# Patient Record
Sex: MALE | Race: WHITE | Employment: FULL TIME | URBAN - METROPOLITAN AREA
[De-identification: names, ages, dates, MRNs, and addresses within clinical notes are randomized per-mention and may not be internally consistent; named-entity substitution may affect disease eponyms.]

---

## 2020-12-03 ENCOUNTER — OFFICE VISIT (OUTPATIENT)
Dept: PRIMARY CARE CLINIC | Age: 40
End: 2020-12-03
Payer: COMMERCIAL

## 2020-12-03 VITALS
SYSTOLIC BLOOD PRESSURE: 126 MMHG | HEIGHT: 72 IN | RESPIRATION RATE: 20 BRPM | BODY MASS INDEX: 33.18 KG/M2 | HEART RATE: 91 BPM | DIASTOLIC BLOOD PRESSURE: 80 MMHG | TEMPERATURE: 97.2 F | WEIGHT: 245 LBS | OXYGEN SATURATION: 100 %

## 2020-12-03 LAB
Lab: NORMAL
QC PASS/FAIL: NORMAL
SARS-COV-2, POC: DETECTED

## 2020-12-03 PROCEDURE — 87426 SARSCOV CORONAVIRUS AG IA: CPT | Performed by: FAMILY MEDICINE

## 2020-12-03 PROCEDURE — 99213 OFFICE O/P EST LOW 20 MIN: CPT | Performed by: FAMILY MEDICINE

## 2020-12-03 ASSESSMENT — ENCOUNTER SYMPTOMS
ABDOMINAL PAIN: 0
SINUS PAIN: 0
SHORTNESS OF BREATH: 0
NAUSEA: 0
SINUS PRESSURE: 0
COUGH: 1
SORE THROAT: 0

## 2020-12-03 NOTE — PROGRESS NOTES
Enrique Bernard  : 1980    Chief Complaint:     Chief Complaint   Patient presents with    Cough     chills    Fever    Generalized Body Aches    Diarrhea       HPI  Enrique Bernard 38 y.o. presents for   Chief Complaint   Patient presents with    Cough     chills    Fever    Generalized Body Aches    Diarrhea     Patient presents for body aches and fever and cough for the past 2 days. He states that his kids and wife all tested positive at home. He denies any shortness of breath currently. He has been tolerating fluids. All questions were answered to patients satisfaction. History reviewed. No pertinent past medical history. History reviewed. No pertinent surgical history. Social History     Socioeconomic History    Marital status:      Spouse name: None    Number of children: None    Years of education: None    Highest education level: None   Occupational History    None   Social Needs    Financial resource strain: None    Food insecurity     Worry: None     Inability: None    Transportation needs     Medical: None     Non-medical: None   Tobacco Use    Smoking status: Never Smoker    Smokeless tobacco: Never Used   Substance and Sexual Activity    Alcohol use: None    Drug use: None    Sexual activity: None   Lifestyle    Physical activity     Days per week: None     Minutes per session: None    Stress: None   Relationships    Social connections     Talks on phone: None     Gets together: None     Attends Taoism service: None     Active member of club or organization: None     Attends meetings of clubs or organizations: None     Relationship status: None    Intimate partner violence     Fear of current or ex partner: None     Emotionally abused: None     Physically abused: None     Forced sexual activity: None   Other Topics Concern    None   Social History Narrative    None       History reviewed. No pertinent family history.        No current outpatient medications on file. No current facility-administered medications for this visit. Allergies   Allergen Reactions    Keflex [Cephalexin]        Health Maintenance Due   Topic Date Due    HIV screen  07/20/1994    DTaP/Tdap/Td vaccine (1 - Tdap) 07/20/1998    Lipid screen  07/20/2019    Diabetes screen  07/20/2019    Flu vaccine (1) 09/01/2020           REVIEW OF SYSTEMS  Review of Systems   Constitutional: Positive for chills and fatigue. Negative for fever. HENT: Positive for postnasal drip. Negative for congestion, ear pain, sinus pressure, sinus pain and sore throat. Respiratory: Positive for cough. Negative for shortness of breath. Cardiovascular: Negative for chest pain. Gastrointestinal: Negative for abdominal pain and nausea. Genitourinary: Negative for frequency. Musculoskeletal: Positive for myalgias. Negative for arthralgias. Skin: Negative for rash. Neurological: Negative for dizziness. Hematological: Negative for adenopathy. Psychiatric/Behavioral: Negative for decreased concentration and sleep disturbance. PHYSICAL EXAM  /80   Pulse 91   Temp 97.2 °F (36.2 °C) (Oral)   Resp 20   Ht 6' (1.829 m)   Wt 245 lb (111.1 kg)   SpO2 100%   BMI 33.23 kg/m²   Physical Exam  Vitals signs and nursing note reviewed. Constitutional:       Appearance: Normal appearance. He is normal weight. HENT:      Head: Normocephalic and atraumatic. Right Ear: Tympanic membrane, ear canal and external ear normal.      Left Ear: Tympanic membrane, ear canal and external ear normal.      Nose: Congestion present. Comments: PND     Mouth/Throat:      Pharynx: Posterior oropharyngeal erythema present. Eyes:      Extraocular Movements: Extraocular movements intact. Conjunctiva/sclera: Conjunctivae normal.   Neck:      Musculoskeletal: Normal range of motion. Cardiovascular:      Rate and Rhythm: Normal rate and regular rhythm.       Pulses: Normal possible risks and complications,  especially if left uncontrolled.     Counseled regarding the possible side effects, risks, benefits and alternatives to treatment; patient and/or guardian verbalizes understanding, agrees,feels comfortable with and wishes to proceed with above treatment plan.     Advised patient to call [facility] new medication issues, and read all Rx info from pharmacy to assure aware of all possible risks and side effects of medication before taking.     Reviewed age and gender appropriate health screening exams and vaccinations. Advised patient regarding importance of keeping up with recommended health maintenance and toschedule as soon as possible if overdue, as this is important in assessing for undiagnosed pathology, especially cancer, as well as protecting against potentially harmful/life threatening disease.          Patient and/or guardian verbalizes understanding and agrees with above counseling, assessment and plan.     All questions answered. Kasie Reina, DO  12/3/20    NOTE: This report was transcribed using voice recognition software.  Every effort was made to ensure accuracy; however, inadvertent computerized transcription errors may be present

## 2022-03-15 ENCOUNTER — OFFICE VISIT (OUTPATIENT)
Dept: FAMILY MEDICINE CLINIC | Age: 42
End: 2022-03-15
Payer: COMMERCIAL

## 2022-03-15 VITALS
BODY MASS INDEX: 33.59 KG/M2 | OXYGEN SATURATION: 97 % | HEIGHT: 72 IN | TEMPERATURE: 97 F | HEART RATE: 84 BPM | WEIGHT: 248 LBS

## 2022-03-15 DIAGNOSIS — J01.10 ACUTE NON-RECURRENT FRONTAL SINUSITIS: ICD-10-CM

## 2022-03-15 DIAGNOSIS — H66.001 NON-RECURRENT ACUTE SUPPURATIVE OTITIS MEDIA OF RIGHT EAR WITHOUT SPONTANEOUS RUPTURE OF TYMPANIC MEMBRANE: Primary | ICD-10-CM

## 2022-03-15 PROCEDURE — 99213 OFFICE O/P EST LOW 20 MIN: CPT | Performed by: NURSE PRACTITIONER

## 2022-03-15 RX ORDER — AMOXICILLIN AND CLAVULANATE POTASSIUM 875; 125 MG/1; MG/1
1 TABLET, FILM COATED ORAL 2 TIMES DAILY
Qty: 20 TABLET | Refills: 0 | Status: SHIPPED | OUTPATIENT
Start: 2022-03-15 | End: 2022-03-25

## 2022-03-15 NOTE — PROGRESS NOTES
Chief Complaint:   Congestion (x3 days), Pharyngitis, Otalgia, and Headache (x1 day)    History of Present Illness   Source of history provided by:  patient. Li Alejandre is a 42 y.o. old male who presents to walk-in for evaluation of sinus pressure, nasal congestion, discolored nasal drainage, bilateral ear pressure, mild productive cough and sore throat x 5 days. Has been taking nothing for the symptoms. Denies any fever, chills, wheezing, CP, SOB, or GI symptoms. Denies any hx of asthma, COPD, or tobacco use. Review of Systems   Unless otherwise stated in this report or unable to obtain because of the patient's clinical or mental status as evidenced by the medical record, this patients's positive and negative responses for Review of Systems, constitutional, psych, eyes, ENT, cardiovascular, respiratory, gastrointestinal, neurological, genitourinary, musculoskeletal, integument systems and systems related to the presenting problem are either stated in the preceding or were negative for the symptoms and/or complaints related to the medical problem. Past Medical History:  has no past medical history on file. Past Surgical History:  has no past surgical history on file. Social History:  reports that he has never smoked. He has never used smokeless tobacco.  Family History: family history is not on file. Allergies: Keflex [cephalexin]    Physical Exam   Vital Signs:  Pulse 84   Temp 97 °F (36.1 °C)   Ht 6' (1.829 m)   Wt 248 lb (112.5 kg)   SpO2 97%   BMI 33.63 kg/m²    Oxygen Saturation Interpretation: Normal.    Constitutional:  Alert, development consistent with age. Head: Mild TTP over the frontal sinuses. Ears: Bilateral pinna normal. Left TM clear without erythema or perforation, right TM with erythema and bulging without perforation. Canals normal bilaterally without swelling or exudate  Nose:  there is mild congestion of the nasal mucosa.  There is mild injection to middle turbinates bilaterally. Throat: There is mild posterior pharyngeal erythema with mild post nasal drip present. No exudate or tonsillar hypertrophy noted. Neck:  Supple. There is no anterior cervical adenopathy. Lungs: CTAB without wheezes, rales, or rhonchi  Heart:  Regular rate and rhythm, normal heart sounds, without pathological murmurs, ectopy, gallops, or rubs. Skin:  Normal turgor. Warm, dry, without visible rash. Neurological:  Alert and oriented. Motor functions intact. Responds to verbal commands. Test Results Section   (All laboratory and radiology results have been personally reviewed by myself)  Labs:  No results found for this visit on 03/15/22. Assessment / Plan   Impression(s):  Mely Garcia was seen today for congestion, pharyngitis, otalgia and headache. Diagnoses and all orders for this visit:    Non-recurrent acute suppurative otitis media of right ear without spontaneous rupture of tympanic membrane  -     amoxicillin-clavulanate (AUGMENTIN) 875-125 MG per tablet; Take 1 tablet by mouth 2 times daily for 10 days    Acute non-recurrent frontal sinusitis  -     amoxicillin-clavulanate (AUGMENTIN) 875-125 MG per tablet; Take 1 tablet by mouth 2 times daily for 10 days    Script written for Augmentin, side effects discussed. Increase fluids and rest. Symptomatic relief discussed. F/u PCP in 5-7 days if symptoms persist. ED sooner if symptoms worsen or change. Red flag symptoms discussed. Patient verbalized understanding and is in agreement with this care plan. All questions answered. Return if symptoms worsen or fail to improve. Electronically signed by CAMERON Cross CNP   DD: 3/15/22    **This report was transcribed using voice recognition software. Every effort was made to ensure accuracy; however, inadvertent computerized transcription errors may be present.

## 2023-01-04 ENCOUNTER — OFFICE VISIT (OUTPATIENT)
Dept: FAMILY MEDICINE CLINIC | Age: 43
End: 2023-01-04
Payer: COMMERCIAL

## 2023-01-04 VITALS
HEART RATE: 102 BPM | HEIGHT: 72 IN | SYSTOLIC BLOOD PRESSURE: 114 MMHG | DIASTOLIC BLOOD PRESSURE: 80 MMHG | RESPIRATION RATE: 18 BRPM | BODY MASS INDEX: 35.49 KG/M2 | TEMPERATURE: 97 F | OXYGEN SATURATION: 96 % | WEIGHT: 262 LBS

## 2023-01-04 DIAGNOSIS — J06.9 URI WITH COUGH AND CONGESTION: Primary | ICD-10-CM

## 2023-01-04 PROCEDURE — 99213 OFFICE O/P EST LOW 20 MIN: CPT

## 2023-01-04 RX ORDER — BENZONATATE 200 MG/1
200 CAPSULE ORAL 3 TIMES DAILY PRN
Qty: 30 CAPSULE | Refills: 0 | Status: SHIPPED | OUTPATIENT
Start: 2023-01-04 | End: 2023-01-11

## 2023-01-04 RX ORDER — PREDNISONE 10 MG/1
TABLET ORAL
Qty: 18 TABLET | Refills: 0 | Status: SHIPPED | OUTPATIENT
Start: 2023-01-04 | End: 2023-01-13

## 2023-01-04 RX ORDER — AMOXICILLIN AND CLAVULANATE POTASSIUM 875; 125 MG/1; MG/1
1 TABLET, FILM COATED ORAL 2 TIMES DAILY
Qty: 14 TABLET | Refills: 0 | Status: SHIPPED | OUTPATIENT
Start: 2023-01-04 | End: 2023-01-11

## 2023-01-04 NOTE — PROGRESS NOTES
2023     Saulo Simms 36 y.o. male    : 1980   Chief Complaint:   Pharyngitis (Home test Neg COVID, started 3 weeks ago lingering cough), Cough, and Drainage      History of Present Illness   Source of history provided by:  patient. Saulo Simms is a 36 y.o. old male who presents to [address] [place] [address] for evaluation of cough x 3 weeks. Associated symptoms include sore throat and drainage . Since onset symptoms have been consistent. Patient has had no known Covid 19 exposure. Patient has not been diagnosed with COVID-19 in the last 90 days. Has taken acetaminophen at home with some symptomatic relief. Denies any fever, chills, CP, dyspnea, LE edema, abdominal pain, nausea, vomiting, rash, dizziness, or lethargy. Denies any history of asthma, pneumonia, recurrent bronchitis or COPD. They have no history of tobacco abuse. ROS   Past Medical History: History reviewed. No pertinent past medical history. Past Surgical History:  has no past surgical history on file. Social History:  reports that he has never smoked. He has never used smokeless tobacco.  Family History: family history is not on file. Allergies: Keflex [cephalexin]    Unless otherwise stated in this report the patient's positive and negative responses for review of systems for constitutional, eyes, ENT, cardiovascular, respiratory, gastrointestinal, neurological, , musculoskeletal, and integument systems and related systems to the presenting problem are either stated in the history of present illness or were not pertinent or were negative for the symptoms and/or complaints related to the presenting medical problem. Positives and pertinent negatives as per HPI. All others reviewed and are negative.     Physical Exam   VS:    Vitals:    23 1611   BP: 114/80   Site: Right Upper Arm   Position: Sitting   Cuff Size: Medium Adult   Pulse: (!) 102   Resp: 18   Temp: 97 °F (36.1 °C)   TempSrc: Temporal   SpO2: 96% Weight: 262 lb (118.8 kg)   Height: 6' (1.829 m)     Oxygen Saturation Interpretation: Normal.    Constitutional:  Alert, development consistent with age. NAD. Head:  NC/NT. Airway patent. Moderate TTP over maxillary and frontal sinuses. Ear: Bilateral external auditory ear canals are clear there is no cerumen, erythema or debris present. Bilateral tympanic membrane intact, translucent and normal cone of light. There is no serous effusion or drainage present. Nose: Bilateral turbinates are swollen. No septal deviation. Rhinorrhea present. Mouth: Posterior pharynx with mild erythema and clear postnasal drip. No tonsillar hypertrophy or exudate. Neck:  Normal ROM. Supple. No anterior cervical adenopathy noted. Lungs: CTAB without wheezes, rales, or rhonchi. CV:  Regular rate and rhythm, normal heart sounds, without pathological murmurs, ectopy, gallops, or rubs. GI: Bowel sounds active x4. Abdomen is soft nontender nondistended. There is no guarding or rebound tenderness noted. Skin:  Normal turgor. Warm, dry, without visible rash. Lymphatic: No lymphangitis or adenopathy noted. Neurological:  Oriented. Motor functions intact. Lab / Imaging Results   All laboratory and radiology results have been personally reviewed by myself. Labs:  No results found for this visit on 01/04/23. Imaging: All Radiology results interpreted by Radiologist unless otherwise noted. No orders to display         Assessment / Eleonora Becca was seen today for pharyngitis, cough and drainage. Diagnoses and all orders for this visit:    URI with cough and congestion  -     amoxicillin-clavulanate (AUGMENTIN) 875-125 MG per tablet; Take 1 tablet by mouth 2 times daily for 7 days  -     benzonatate (TESSALON) 200 MG capsule; Take 1 capsule by mouth 3 times daily as needed for Cough  -     predniSONE (DELTASONE) 10 MG tablet;  Take 1 tablet by mouth three times daily for 3 days, THEN 1 tablet 2 times daily for 3 days, THEN 1 tablet daily for 3 days. Disposition:  Disposition: Discharge to home    Script for Augmentin, Tessalon, and Prednisone prescribed, side effects discussed. Follow-up with PCP in 7 to 10 days. Red flag symptoms were discussed with the patient including high or refractory fever, progressive SOB, dyspnea, CP, calf pain/swelling, shaking chills, vomiting, abdominal pain, lethargy, flank pain, or decreased urinary output. If any of these occur, they should go immediately to the emergency department for further evaluation. All questions were answered. The patient relies understanding and was agreeable to the above plan. Concetta Gutierrez, APRN - CNP    *NOTE: This report was transcribed using voice recognition software. Every effort was made to ensure accuracy; however, inadvertent computerized transcription errors may be present.

## 2023-01-20 ENCOUNTER — TELEPHONE (OUTPATIENT)
Dept: PRIMARY CARE CLINIC | Age: 43
End: 2023-01-20

## 2023-01-20 NOTE — TELEPHONE ENCOUNTER
----- Message from Arsalan Moran sent at 1/20/2023 11:32 AM EST -----  Subject: Appointment Request    Reason for Call: New Patient/New to Provider Appointment needed: New   Patient Request Appointment    QUESTIONS    Reason for appointment request? No appointments available during search     Additional Information for Provider? patients wife is a patient here and   he would like to be one also. He is off work right now until Feb 13 and   was hoping to come in before then because he works out of town.  please   call and let him know if this is something that can be done.  ---------------------------------------------------------------------------  --------------  Dominga Wolfhouse INFO  [phone]; OK to leave message on voicemail  ---------------------------------------------------------------------------  --------------  SCRIPT ANSWERS  COVID Screen: Isis [facility]

## 2023-02-09 ENCOUNTER — OFFICE VISIT (OUTPATIENT)
Dept: PRIMARY CARE CLINIC | Age: 43
End: 2023-02-09

## 2023-02-09 DIAGNOSIS — E03.9 ACQUIRED HYPOTHYROIDISM: ICD-10-CM

## 2023-02-09 DIAGNOSIS — Z00.01 ENCOUNTER FOR ANNUAL GENERAL MEDICAL EXAMINATION WITH ABNORMAL FINDINGS IN ADULT: ICD-10-CM

## 2023-02-09 DIAGNOSIS — E78.2 MIXED HYPERLIPIDEMIA: Primary | ICD-10-CM

## 2023-02-09 DIAGNOSIS — M15.9 PRIMARY OSTEOARTHRITIS INVOLVING MULTIPLE JOINTS: ICD-10-CM

## 2023-02-09 DIAGNOSIS — Z80.0 FAMILY HISTORY OF COLON CANCER IN MOTHER: ICD-10-CM

## 2023-02-09 DIAGNOSIS — E55.9 VITAMIN D DEFICIENCY: ICD-10-CM
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
## 2023-02-10 VITALS
HEIGHT: 72 IN | TEMPERATURE: 97.3 F | DIASTOLIC BLOOD PRESSURE: 78 MMHG | SYSTOLIC BLOOD PRESSURE: 124 MMHG | WEIGHT: 265.2 LBS | BODY MASS INDEX: 35.92 KG/M2

## 2023-02-10 PROBLEM — E55.9 VITAMIN D DEFICIENCY: Status: ACTIVE | Noted: 2023-02-10

## 2023-02-10 PROBLEM — M15.9 PRIMARY OSTEOARTHRITIS INVOLVING MULTIPLE JOINTS: Status: ACTIVE | Noted: 2023-02-10

## 2023-02-10 PROBLEM — E78.2 MIXED HYPERLIPIDEMIA: Status: ACTIVE | Noted: 2023-02-10

## 2023-02-10 PROBLEM — Z80.0 FAMILY HISTORY OF COLON CANCER IN MOTHER: Status: ACTIVE | Noted: 2023-02-10

## 2023-02-10 ASSESSMENT — PATIENT HEALTH QUESTIONNAIRE - PHQ9
SUM OF ALL RESPONSES TO PHQ QUESTIONS 1-9: 0
SUM OF ALL RESPONSES TO PHQ QUESTIONS 1-9: 0
2. FEELING DOWN, DEPRESSED OR HOPELESS: 0
SUM OF ALL RESPONSES TO PHQ9 QUESTIONS 1 & 2: 0
SUM OF ALL RESPONSES TO PHQ QUESTIONS 1-9: 0
SUM OF ALL RESPONSES TO PHQ QUESTIONS 1-9: 0
1. LITTLE INTEREST OR PLEASURE IN DOING THINGS: 0

## 2023-02-10 ASSESSMENT — ENCOUNTER SYMPTOMS
GASTROINTESTINAL NEGATIVE: 1
EYES NEGATIVE: 1
ALLERGIC/IMMUNOLOGIC NEGATIVE: 1
RESPIRATORY NEGATIVE: 1

## 2023-02-18 ENCOUNTER — OFFICE VISIT (OUTPATIENT)
Dept: PRIMARY CARE CLINIC | Age: 43
End: 2023-02-18
Payer: COMMERCIAL

## 2023-02-18 DIAGNOSIS — E03.9 ACQUIRED HYPOTHYROIDISM: ICD-10-CM

## 2023-02-18 DIAGNOSIS — E55.9 VITAMIN D DEFICIENCY: ICD-10-CM

## 2023-02-18 DIAGNOSIS — I10 ESSENTIAL HYPERTENSION: ICD-10-CM

## 2023-02-18 DIAGNOSIS — E78.2 MIXED HYPERLIPIDEMIA: ICD-10-CM

## 2023-02-18 DIAGNOSIS — R73.03 PREDIABETES: ICD-10-CM

## 2023-02-18 DIAGNOSIS — R79.89 ELEVATED LIVER FUNCTION TESTS: ICD-10-CM

## 2023-02-18 DIAGNOSIS — Z00.01 ENCOUNTER FOR ANNUAL GENERAL MEDICAL EXAMINATION WITH ABNORMAL FINDINGS IN ADULT: Primary | ICD-10-CM

## 2023-02-18 PROCEDURE — 99396 PREV VISIT EST AGE 40-64: CPT | Performed by: FAMILY MEDICINE

## 2023-02-18 PROCEDURE — 93000 ELECTROCARDIOGRAM COMPLETE: CPT | Performed by: FAMILY MEDICINE

## 2023-02-18 PROCEDURE — 3074F SYST BP LT 130 MM HG: CPT | Performed by: FAMILY MEDICINE

## 2023-02-18 PROCEDURE — 3078F DIAST BP <80 MM HG: CPT | Performed by: FAMILY MEDICINE

## 2023-02-18 NOTE — PROGRESS NOTES
23  Name: Chuckie Adame    : 1980    Sex: male    Age: 36 y.o. Subjective:  Chief Complaint: Patient is here for 2-week check regarding wellness, sugar liver function cholesterol triglycerides thyroid vitamin D. Feels well. No chest pain or shortness of breath. Has not had lab work done in a long time. His laboratory studies shows fasting sugars 101 elevated liver function elevated cholesterol 261 triglyceride 290 with a low HDL elevated TSH and a low vitamin D. His wife is present in the room. His diet is poor eats fast food. Review of Systems   Constitutional: Negative. HENT: Negative. Eyes: Negative. Respiratory: Negative. Cardiovascular: Negative. Gastrointestinal: Negative. Endocrine: Negative. Genitourinary: Negative. Musculoskeletal: Negative. Skin: Negative. Allergic/Immunologic: Negative. Neurological: Negative. Hematological: Negative. Psychiatric/Behavioral: Negative. No current outpatient medications on file.   Allergies   Allergen Reactions    Keflex [Cephalexin]      Social History     Socioeconomic History    Marital status:      Spouse name: Not on file    Number of children: Not on file    Years of education: Not on file    Highest education level: Not on file   Occupational History    Not on file   Tobacco Use    Smoking status: Never    Smokeless tobacco: Never   Substance and Sexual Activity    Alcohol use: Not on file    Drug use: Not on file    Sexual activity: Not on file   Other Topics Concern    Not on file   Social History Narrative     to Elenita Ulrich    Mother-in-law is Marla Sellers    [employer]  has of     Fracture wrist     1 child from a previous marriage 1 with the present marriage    Wife has 2 children from previous marriage    Mother diabetic colon cancer    Father diabetic obesity    2 brothers    =========================    As of ,       Elevated fasting sugar 101 Elevated liver function    Elevated cholesterol triglycerides with low HDL    Elevated TSH    Low vitamin D    =======================     Social Determinants of Health     Financial Resource Strain: Not on file   Food Insecurity: Not on file   Transportation Needs: Not on file   Physical Activity: Not on file   Stress: Not on file   Social Connections: Not on file   Intimate Partner Violence: Not on file   Housing Stability: Not on file      No past medical history on file. No family history on file. No past surgical history on file. Vitals:    02/18/23 1113   BP: (!) 428/84   Temp: 98.2 °F (36.8 °C)   TempSrc: Oral   Weight: 262 lb (118.8 kg)   Height: 6' (1.829 m)       Objective:    Physical Exam  Vitals reviewed. Constitutional:       Appearance: Normal appearance. He is well-developed. HENT:      Head: Normocephalic. Right Ear: Tympanic membrane normal.      Left Ear: Tympanic membrane normal.      Nose: Nose normal.      Mouth/Throat:      Mouth: Mucous membranes are moist.   Eyes:      Pupils: Pupils are equal, round, and reactive to light. Cardiovascular:      Rate and Rhythm: Normal rate and regular rhythm. Pulmonary:      Effort: Pulmonary effort is normal.      Breath sounds: Normal breath sounds. Abdominal:      General: Bowel sounds are normal.      Palpations: Abdomen is soft. Musculoskeletal:         General: Normal range of motion. Cervical back: Normal range of motion. Skin:     General: Skin is warm. Neurological:      Mental Status: He is alert and oriented to person, place, and time. Psychiatric:         Behavior: Behavior normal.       Wen Joshi was seen today for discuss labs. Diagnoses and all orders for this visit:    Encounter for annual general medical examination with abnormal findings in adult    Essential hypertension  -     EKG 12 lead; Future  -     EKG 12 lead    Mixed hyperlipidemia  -     Comprehensive Metabolic Panel;  Future  -     Hemoglobin A1C; Future  -     Lipid Panel; Future  -     T4; Future  -     TSH; Future  -     Vitamin D 25 Hydroxy; Future    Elevated liver function tests  -     Comprehensive Metabolic Panel; Future    Acquired hypothyroidism  -     T4; Future  -     TSH; Future    Vitamin D deficiency  -     Vitamin D 25 Hydroxy; Future    Prediabetes  -     Hemoglobin A1C; Future      Comments: Aggressive low-fat low sugar low-carb diet. Cut out fast foods. D3 2000 daily. We will keep a close eye on cholesterol liver function and thyroid sugar. Wife in the room and educated on aggressive low-fat low sugar low-carb diet. No alcohol. Return will be in 3 months blood work 1 week before may need to start statin therapy at that time. A great deal of time was spent reviewing records and establishing treatment protocol and treatment plan. The majority of the time was spent on face-to-face exam and education. I educated the patient about all medications. Make sure they were correct and educated  on the risk associated with missing meds or taking them incorrectly. A list of medications is being sent home with patient today. Check blood pressure at home twice a day. Low-salt low caffeine diet. Call if systolic blood pressure is above 292 and diastolic blood pressures above 85. Only use a upper arm digital cuff. Aggressive low-fat diet. Avoid red meats, greasy fried foods, dairy products. Avoid processed foods. Take cholesterol medications without food. A great deal of time spent reviewing medications, diet, exercise, social issues. Also reviewing the chart before entering the room with patient and finishing charting after leaving patient's room. More than half of that time was spent face to face with the patient in counseling and coordinating care. I informed patient about the risk associated with noncompliance of medication and taking medications incorrectly.   Appropriate follow-up with myself and all specialist.  Encourage family members to take active role in assisting with medications and medical care.  If any confusion should develop to notify my office immediately to avoid risk of worsening medical condition      Follow Up: Return in about 3 months (around 5/18/2023) for Lab Before.     Seen by:  Salvador Horne DO

## 2023-02-19 VITALS
SYSTOLIC BLOOD PRESSURE: 428 MMHG | HEIGHT: 72 IN | BODY MASS INDEX: 35.49 KG/M2 | TEMPERATURE: 98.2 F | WEIGHT: 262 LBS | DIASTOLIC BLOOD PRESSURE: 84 MMHG

## 2023-02-19 PROBLEM — R73.03 PREDIABETES: Status: ACTIVE | Noted: 2023-02-19

## 2023-02-19 PROBLEM — I10 ESSENTIAL HYPERTENSION: Status: ACTIVE | Noted: 2023-02-19

## 2023-02-19 PROBLEM — E03.9 ACQUIRED HYPOTHYROIDISM: Status: ACTIVE | Noted: 2023-02-19

## 2023-02-19 PROBLEM — R79.89 ELEVATED LIVER FUNCTION TESTS: Status: ACTIVE | Noted: 2023-02-19

## 2023-02-19 ASSESSMENT — ENCOUNTER SYMPTOMS
RESPIRATORY NEGATIVE: 1
GASTROINTESTINAL NEGATIVE: 1
EYES NEGATIVE: 1
ALLERGIC/IMMUNOLOGIC NEGATIVE: 1

## 2023-05-19 DIAGNOSIS — R73.03 PREDIABETES: ICD-10-CM

## 2023-05-19 DIAGNOSIS — E78.2 MIXED HYPERLIPIDEMIA: ICD-10-CM

## 2023-05-19 DIAGNOSIS — R79.89 ELEVATED LIVER FUNCTION TESTS: ICD-10-CM

## 2023-05-19 DIAGNOSIS — E55.9 VITAMIN D DEFICIENCY: ICD-10-CM

## 2023-05-19 DIAGNOSIS — E03.9 ACQUIRED HYPOTHYROIDISM: ICD-10-CM

## 2023-05-19 LAB
ALBUMIN SERPL-MCNC: 4.3 G/DL (ref 3.5–5.2)
ALP SERPL-CCNC: 70 U/L (ref 40–129)
ALT SERPL-CCNC: 47 U/L (ref 0–40)
ANION GAP SERPL CALCULATED.3IONS-SCNC: 12 MMOL/L (ref 7–16)
AST SERPL-CCNC: 29 U/L (ref 0–39)
BILIRUB SERPL-MCNC: 0.7 MG/DL (ref 0–1.2)
BUN SERPL-MCNC: 12 MG/DL (ref 6–20)
CALCIUM SERPL-MCNC: 9.6 MG/DL (ref 8.6–10.2)
CHLORIDE SERPL-SCNC: 103 MMOL/L (ref 98–107)
CHOLESTEROL, TOTAL: 224 MG/DL (ref 0–199)
CO2 SERPL-SCNC: 24 MMOL/L (ref 22–29)
CREAT SERPL-MCNC: 1 MG/DL (ref 0.7–1.2)
GLUCOSE SERPL-MCNC: 102 MG/DL (ref 74–99)
HBA1C MFR BLD: 5.6 % (ref 4–5.6)
HDLC SERPL-MCNC: 35 MG/DL
LDLC SERPL CALC-MCNC: 121 MG/DL (ref 0–99)
POTASSIUM SERPL-SCNC: 4.9 MMOL/L (ref 3.5–5)
PROT SERPL-MCNC: 7.4 G/DL (ref 6.4–8.3)
SODIUM SERPL-SCNC: 139 MMOL/L (ref 132–146)
T4 SERPL-MCNC: 6.2 MCG/DL (ref 4.5–11.7)
TRIGL SERPL-MCNC: 341 MG/DL (ref 0–149)
TSH SERPL-MCNC: 2.59 UIU/ML (ref 0.27–4.2)
VITAMIN D 25-HYDROXY: 47 NG/ML (ref 30–100)
VLDLC SERPL CALC-MCNC: 68 MG/DL

## 2023-05-20 ENCOUNTER — OFFICE VISIT (OUTPATIENT)
Dept: PRIMARY CARE CLINIC | Age: 43
End: 2023-05-20

## 2023-05-20 DIAGNOSIS — E55.9 VITAMIN D DEFICIENCY: ICD-10-CM

## 2023-05-20 DIAGNOSIS — E78.2 MIXED HYPERLIPIDEMIA: Primary | ICD-10-CM

## 2023-05-20 DIAGNOSIS — I10 ESSENTIAL HYPERTENSION: ICD-10-CM

## 2023-05-20 DIAGNOSIS — R79.89 ELEVATED LIVER FUNCTION TESTS: ICD-10-CM

## 2023-05-20 DIAGNOSIS — Z00.01 ENCOUNTER FOR ANNUAL GENERAL MEDICAL EXAMINATION WITH ABNORMAL FINDINGS IN ADULT: ICD-10-CM

## 2023-05-20 NOTE — PROGRESS NOTES
23  Name: Sarah Kimball    : 1980    Sex: male    Age: 36 y.o.  3-month check regarding      Subjective:  Chief Complaint: Patient is here for sugar and liver function cholesterol triglycerides thyroid vitamin D. Feels well. No chest pain or shortness of breath. He got an aggressive diet lost 12 pounds. Taking a supplement his wife has not taken call peak a.m. and p.m. His thyroid is better hemoglobin A1c is 6.6. Cholesterol better. Triglycerides up a little bit. Liver functions better. He still declines statin. Total lipids are better when  We need to consider statin. Started playing hockey again. Doing exercise      Review of Systems   Constitutional: Negative. HENT: Negative. Eyes: Negative. Respiratory: Negative. Cardiovascular: Negative. Gastrointestinal: Negative. Endocrine: Negative. Genitourinary: Negative. Musculoskeletal: Negative. Skin: Negative. Allergic/Immunologic: Negative. Neurological: Negative. Hematological: Negative. Psychiatric/Behavioral: Negative. No current outpatient medications on file.   Allergies   Allergen Reactions    Keflex [Cephalexin]      Social History     Socioeconomic History    Marital status:      Spouse name: Not on file    Number of children: Not on file    Years of education: Not on file    Highest education level: Not on file   Occupational History    Not on file   Tobacco Use    Smoking status: Never    Smokeless tobacco: Never   Substance and Sexual Activity    Alcohol use: Not on file    Drug use: Not on file    Sexual activity: Not on file   Other Topics Concern    Not on file   Social History Narrative     to [place]    Mother-in-law is Gatito Campbell    [employer]  has of 2-23    Fracture wrist 2008    1 child from a previous marriage 1 with the present marriage    Wife has 2 children from previous marriage    Mother diabetic colon cancer    Father diabetic obesity

## 2023-05-21 VITALS
WEIGHT: 250.8 LBS | DIASTOLIC BLOOD PRESSURE: 82 MMHG | HEIGHT: 72 IN | TEMPERATURE: 97.4 F | BODY MASS INDEX: 33.97 KG/M2 | SYSTOLIC BLOOD PRESSURE: 128 MMHG

## 2023-05-21 ASSESSMENT — PATIENT HEALTH QUESTIONNAIRE - PHQ9
1. LITTLE INTEREST OR PLEASURE IN DOING THINGS: 0
2. FEELING DOWN, DEPRESSED OR HOPELESS: 0
SUM OF ALL RESPONSES TO PHQ QUESTIONS 1-9: 0
SUM OF ALL RESPONSES TO PHQ QUESTIONS 1-9: 0
SUM OF ALL RESPONSES TO PHQ9 QUESTIONS 1 & 2: 0
SUM OF ALL RESPONSES TO PHQ QUESTIONS 1-9: 0
SUM OF ALL RESPONSES TO PHQ QUESTIONS 1-9: 0

## 2024-02-12 ENCOUNTER — OFFICE VISIT (OUTPATIENT)
Dept: FAMILY MEDICINE CLINIC | Age: 44
End: 2024-02-12
Payer: COMMERCIAL

## 2024-02-12 VITALS
BODY MASS INDEX: 33.86 KG/M2 | WEIGHT: 250 LBS | DIASTOLIC BLOOD PRESSURE: 72 MMHG | SYSTOLIC BLOOD PRESSURE: 118 MMHG | HEIGHT: 72 IN | HEART RATE: 86 BPM | OXYGEN SATURATION: 97 % | TEMPERATURE: 98 F

## 2024-02-12 DIAGNOSIS — R52 GENERALIZED BODY ACHES: ICD-10-CM

## 2024-02-12 DIAGNOSIS — U07.1 COVID-19: Primary | ICD-10-CM

## 2024-02-12 LAB
INFLUENZA A ANTIBODY: NEGATIVE
INFLUENZA B ANTIBODY: NEGATIVE
Lab: ABNORMAL
PERFORMING INSTRUMENT: ABNORMAL
QC PASS/FAIL: ABNORMAL
SARS-COV-2, POC: DETECTED

## 2024-02-12 PROCEDURE — 87426 SARSCOV CORONAVIRUS AG IA: CPT | Performed by: PHYSICIAN ASSISTANT

## 2024-02-12 PROCEDURE — 3074F SYST BP LT 130 MM HG: CPT | Performed by: PHYSICIAN ASSISTANT

## 2024-02-12 PROCEDURE — 87804 INFLUENZA ASSAY W/OPTIC: CPT | Performed by: PHYSICIAN ASSISTANT

## 2024-02-12 PROCEDURE — 99203 OFFICE O/P NEW LOW 30 MIN: CPT | Performed by: PHYSICIAN ASSISTANT

## 2024-02-12 PROCEDURE — 3078F DIAST BP <80 MM HG: CPT | Performed by: PHYSICIAN ASSISTANT

## 2024-02-12 NOTE — PROGRESS NOTES
24  Gerson Sargent : 1980 Sex: male  Age 43 y.o.      Subjective:  Chief Complaint   Patient presents with    Congestion    Chills    Generalized Body Aches         HPI:   HPI  Gerson Sargent , 43 y.o. male presents to [facility] care for evaluation of cough, congestion, drainage, chills and myalgias.  The patient started with the symptoms on Thursday evening.  The patient had progressed over the weekend.  Increased congestion, drainage.  Patient does note headache.  The patient did get COVID-vaccine of the initial 2 but no boosters.  The patient did not have a flu shot.  The patient states that he is a .        ROS:   Unless otherwise stated in this report the patient's positive and negative responses for review of systems for constitutional, eyes, ENT, cardiovascular, respiratory, gastrointestinal, neurological, , musculoskeletal, and integument systems and related systems to the presenting problem are either stated in the history of present illness or were not pertinent or were negative for the symptoms and/or complaints related to the presenting medical problem.  Positives and pertinent negatives as per HPI.  All others reviewed and are negative.      PMH:   History reviewed. No pertinent past medical history.    History reviewed. No pertinent surgical history.    History reviewed. No pertinent family history.    Medications:   No current outpatient medications on file.    Allergies:     Allergies   Allergen Reactions    Keflex [Cephalexin]        Social History:     Social History     Tobacco Use    Smoking status: Never    Smokeless tobacco: Never       Patient lives at home.    Physical Exam:     Vitals:    24 1353   BP: 118/72   Pulse: 86   Temp: 98 °F (36.7 °C)   SpO2: 97%   Weight: 113.4 kg (250 lb)   Height: 1.829 m (6' 0.01\")       Exam:  Physical Exam  Nurse's notes and vital signs reviewed. The patient is not hypoxic.  ?  General: Alert, no acute distress, patient resting